# Patient Record
Sex: FEMALE | Race: WHITE | NOT HISPANIC OR LATINO | Employment: UNEMPLOYED | URBAN - METROPOLITAN AREA
[De-identification: names, ages, dates, MRNs, and addresses within clinical notes are randomized per-mention and may not be internally consistent; named-entity substitution may affect disease eponyms.]

---

## 2018-01-29 ENCOUNTER — OFFICE VISIT (OUTPATIENT)
Dept: URGENT CARE | Facility: CLINIC | Age: 33
End: 2018-01-29
Payer: MEDICAID

## 2018-01-29 VITALS
SYSTOLIC BLOOD PRESSURE: 119 MMHG | HEIGHT: 62 IN | TEMPERATURE: 98 F | HEART RATE: 101 BPM | WEIGHT: 220 LBS | OXYGEN SATURATION: 99 % | RESPIRATION RATE: 18 BRPM | BODY MASS INDEX: 40.48 KG/M2 | DIASTOLIC BLOOD PRESSURE: 89 MMHG

## 2018-01-29 DIAGNOSIS — K52.9 GASTROENTERITIS: Primary | ICD-10-CM

## 2018-01-29 DIAGNOSIS — L73.2 HIDRADENITIS SUPPURATIVA: ICD-10-CM

## 2018-01-29 PROCEDURE — 99203 OFFICE O/P NEW LOW 30 MIN: CPT | Mod: S$GLB,,, | Performed by: PHYSICIAN ASSISTANT

## 2018-01-29 RX ORDER — ONDANSETRON 4 MG/1
4 TABLET, ORALLY DISINTEGRATING ORAL EVERY 6 HOURS PRN
Qty: 20 TABLET | Refills: 0 | Status: SHIPPED | OUTPATIENT
Start: 2018-01-29 | End: 2023-10-10

## 2018-01-29 NOTE — PROGRESS NOTES
"Subjective:       Patient ID: Page Francois is a 32 y.o. female.    Vitals:  height is 5' 2" (1.575 m) and weight is 99.8 kg (220 lb). Her temperature is 97.8 °F (36.6 °C). Her blood pressure is 119/89 and her pulse is 101. Her respiration is 18 and oxygen saturation is 99%.     Chief Complaint: Emesis    Emesis    This is a new problem. The current episode started today. The problem occurs 2 to 4 times per day. The problem has been unchanged. The emesis has an appearance of stomach contents (tasted like metal). The maximum temperature recorded prior to her arrival was 100.4 - 100.9 F. The fever has been present for less than 1 day. Associated symptoms include chills and headaches. Pertinent negatives include no abdominal pain, chest pain, coughing, fever or myalgias. She has tried nothing for the symptoms. The treatment provided no relief.     Review of Systems   Constitution: Positive for chills, weakness and malaise/fatigue. Negative for fever.   HENT: Positive for congestion. Negative for ear pain, hoarse voice and sore throat.    Eyes: Negative for discharge and redness.   Cardiovascular: Negative for chest pain, dyspnea on exertion and leg swelling.   Respiratory: Negative for cough, shortness of breath, sputum production and wheezing.    Musculoskeletal: Negative for myalgias.   Gastrointestinal: Positive for nausea and vomiting. Negative for abdominal pain.   Neurological: Positive for headaches.       Objective:      Physical Exam   Constitutional: She is oriented to person, place, and time. She appears well-developed and well-nourished.   HENT:   Head: Normocephalic and atraumatic.   Right Ear: Hearing, tympanic membrane, external ear and ear canal normal.   Left Ear: Hearing, tympanic membrane, external ear and ear canal normal.   Nose: Nose normal.   Mouth/Throat: Uvula is midline and oropharynx is clear and moist.   Eyes: Conjunctivae are normal.   Neck: Normal range of motion. Neck supple. No thyromegaly " present.   Cardiovascular: Normal rate and regular rhythm.  Exam reveals no gallop and no friction rub.    No murmur heard.  Pulmonary/Chest: Effort normal and breath sounds normal. She has no wheezes. She has no rales.   Abdominal: Soft. Bowel sounds are normal. There is no tenderness.   Musculoskeletal: Normal range of motion.   Lymphadenopathy:     She has no cervical adenopathy.   Neurological: She is alert and oriented to person, place, and time.   Skin: Skin is warm and dry. No rash noted. No erythema.   Scarring in bilateral axilla from previous abscesses in the past.    Psychiatric: She has a normal mood and affect. Her behavior is normal. Judgment and thought content normal.   Nursing note and vitals reviewed.      Assessment:       1. Gastroenteritis    2. Hidradenitis suppurativa        Plan:         Gastroenteritis  -     ondansetron (ZOFRAN-ODT) 4 MG TbDL; Take 1 tablet (4 mg total) by mouth every 6 (six) hours as needed.  Dispense: 20 tablet; Refill: 0    Hidradenitis suppurativa      Page was seen today for emesis.    Diagnoses and all orders for this visit:    Gastroenteritis  -     ondansetron (ZOFRAN-ODT) 4 MG TbDL; Take 1 tablet (4 mg total) by mouth every 6 (six) hours as needed.    Hidradenitis suppurativa      Patient Instructions     - Rest.    - Drink plenty of fluids.    - Tylenol or Ibuprofen as directed as needed for fever/pain.    - Take over-the-counter claritin, zyrtec, allegra, or xyzal as directed.  - Use over the counter Flonase as directed for sinus congestion and postnasal drip.  - use nasal saline prior to Flonase.  - Antibiotics are not needed at this time.  - Usual course of cold symptom is 10-14 days, but longer if patient is a smoker.   - Use salt water gargle for sore throat.   - Follow up with your PCP or specialty clinic as directed in the next 1-2 weeks if not improved or as needed.  You can call [phone] to schedule an appointment with the appropriate provider.     - Go to the ED if your symptoms worsen.    Food Poisoning or Viral Gastroenteritis (Adult)  You have a stomach illness that is likely either food poisoning or viral gastroenteritis.  Food poisoning is illness that is passed along in food and affects the stomach and intestinal tract. It usually occurs from 1 to 24 hours after eating food that has spoiled. When it happens within a few hours of eating, it is often caused by toxins from bacteria in food that has not been cooked or refrigerated properly.  Viral gastroenteritis is also an illness from a virus that affects the stomach and intestinal tract. Many people call it the stomach flu, but it has nothing to do with influenza. In fact, it can happen from food poisoning, but it can also happen when germs are passed from person-to-person or contaminated surface (toothbrush, cutting board, toilet) to a person.  Either illness can cause these symptoms:  · Abdominal pain and cramping  · Nausea  · Vomiting  · Diarrhea  · Fever and chills  · Loss of bowel control  The symptoms of food poisoning usually last 1 to 2 days. The symptoms of viral gastroenteritis can sometimes last up to 7 days but usually end sooner.  Antibiotics are not effective for either illness.  Other causes of gastroenteritis include bacteria and parasites which are not discussed here.  Home care  Follow all instructions given by your healthcare provider. Rest at home for the next 24 hours, or until you feel better. Avoid caffeine, tobacco, and alcohol. These can make diarrhea, cramping, and pain worse.  If taking medicines:  · Dont take over-the-counter diarrhea or nausea medicines unless your healthcare provider tells you to.  · You may use acetaminophen or NSAID medicines like ibuprofen or naproxen to reduce pain and fever. Dont use these if you have chronic liver or kidney disease, or ever had a stomach ulcer or GI bleeding. Talk with your healthcare provider first. Don't use NSAID medicines if  you are already taking one for another condition (like arthritis) or are on daily aspirin therapy (such as for heart disease or after a stroke).  To prevent the spread of illness:  · Remember that washing with soap and water is the best way to prevent the spread of infection. Wash your hands before and after caring for a sick person.  · Clean the toilet after each use.  · Wash your hands or use alcohol-based hand  before eating.  · Wash your hands or use alcohol-based hand  before and after preparing food. Keep in mind that people with diarrhea or vomiting should not prepare food for others.  · Wash your hands or use alcohol-based hand  after using cutting boards, counter-tops, and knives (and other utensils) that have been in contact with raw foods.  · Wash and then peel fruits and vegetables.  · Keep uncooked meats away from cooked and ready-to-eat foods.  · Use a food thermometer when cooking. Cook poultry to at least 165°F (74°C). Cook ground meat (beef, veal, pork, lamb) to at least 160°F (71°C). Cook fresh beef, veal, lamb, and pork to at least 145°F (63°C).  · Dont eat raw or undercooked eggs (poached or bridget side up), poultry, meat or unpasteurized milk and juices.  Food and drinks  The main goal while treating vomiting or diarrhea is to prevent dehydration. This is done by taking small amounts of liquids often.  · Keep in mind that liquids are more important than food right now.  · Drink only small amounts of liquids at a time.  · Dont force yourself to eat, especially if you are having cramping, vomiting, or diarrhea. Dont eat large amounts at a time, even if you are hungry.  · If you eat, avoid fatty, greasy, spicy, or fried foods.  · Dont eat dairy foods or drink milk if you have diarrhea. These can make diarrhea worse.  The first 24 hours you can try:  · Oral rehydration solutions, available at grocery stores and pharmacies. Sports drinks are not a good choice if you are  very dehydrated. They have too much sugar and not enough electrolytes.  · Soft drinks without caffeine  · Ginger ale  · Water (plain or flavored)  · Decaf tea or coffee  · Clear broth, consommé, or bouillon  · Gelatin, popsicles, or frozen fruit juice bars   The second 24 hours, if you are feeling better, you can add:  · Hot cereal, plain toast, bread, rolls, or crackers  · Plain noodles, rice, mashed potatoes, chicken noodle soup, or rice soup  · Unsweetened canned fruit (no pineapple)  · Bananas  As you recover:  · Limit fat intake to less than 15 grams per day. Dont eat margarine, butter, oils, mayonnaise, sauces, gravies, fried foods, peanut butter, meat, poultry, or fish.  · Limit fiber. Dont eat raw or cooked vegetables, fresh fruits except bananas, and bran cereals.  · Limit caffeine and chocolate.  · Dont use spices or seasonings except salt.  · Resume a normal diet over time, as you feel better and your symptoms improve.  · If the symptoms come back, go back to a simple diet or clear liquids.  Follow-up care  Follow up with your healthcare provider, or as advised. If a stool sample was taken or cultures were done, call the healthcare provider for the results as instructed.  Call 911  Call 911 if you have any of these symptoms:  · Trouble breathing  · Confusion  · Extreme drowsiness or trouble walking  · Loss of consciousness  · Rapid heart rate  · Chest pain  · Stiff neck  · Seizure  When to seek medical advice  Call your healthcare provider right away if any of these occur:  · Abdominal pain that gets worse  · Constant lower right abdominal pain  · Continued vomiting and inability to keep liquids down  · Diarrhea more than 5 times a day  · Blood in vomit or stool  · Dark urine or no urine for 8 hours, dry mouth and tongue, tiredness, weakness, or dizziness  · New rash  · You dont get better in 2 to 3 days  · Fever of 100.4°F (38°C) or higher that doesnt get lower with medicine  · You have new symptoms  of arthritis  Date Last Reviewed: 1/3/2016  © 6794-5038 [employer]. [address] [place] [address], [place] [address]. All rights reserved. This information is not intended as a substitute for professional medical care. Always follow your healthcare professional's instructions.        Food Poisoning or Viral Gastroenteritis (Adult)  You have a stomach illness that is likely either food poisoning or viral gastroenteritis.  Food poisoning is illness that is passed along in food and affects the stomach and intestinal tract. It usually occurs from 1 to 24 hours after eating food that has spoiled. When it happens within a few hours of eating, it is often caused by toxins from bacteria in food that has not been cooked or refrigerated properly.  Viral gastroenteritis is also an illness from a virus that affects the stomach and intestinal tract. Many people call it the stomach flu, but it has nothing to do with influenza. In fact, it can happen from food poisoning, but it can also happen when germs are passed from person-to-person or contaminated surface (toothbrush, cutting board, toilet) to a person.  Either illness can cause these symptoms:  · Abdominal pain and cramping  · Nausea  · Vomiting  · Diarrhea  · Fever and chills  · Loss of bowel control  The symptoms of food poisoning usually last 1 to 2 days. The symptoms of viral gastroenteritis can sometimes last up to 7 days but usually end sooner.  Antibiotics are not effective for either illness.  Other causes of gastroenteritis include bacteria and parasites which are not discussed here.  Home care  Follow all instructions given by your healthcare provider. Rest at home for the next 24 hours, or until you feel better. Avoid caffeine, tobacco, and alcohol. These can make diarrhea, cramping, and pain worse.  If taking medicines:  · Dont take over-the-counter diarrhea or nausea medicines unless your healthcare provider tells you to.  · You may use acetaminophen  or NSAID medicines like ibuprofen or naproxen to reduce pain and fever. Dont use these if you have chronic liver or kidney disease, or ever had a stomach ulcer or GI bleeding. Talk with your healthcare provider first. Don't use NSAID medicines if you are already taking one for another condition (like arthritis) or are on daily aspirin therapy (such as for heart disease or after a stroke).  To prevent the spread of illness:  · Remember that washing with soap and water is the best way to prevent the spread of infection. Wash your hands before and after caring for a sick person.  · Clean the toilet after each use.  · Wash your hands or use alcohol-based hand  before eating.  · Wash your hands or use alcohol-based hand  before and after preparing food. Keep in mind that people with diarrhea or vomiting should not prepare food for others.  · Wash your hands or use alcohol-based hand  after using cutting boards, counter-tops, and knives (and other utensils) that have been in contact with raw foods.  · Wash and then peel fruits and vegetables.  · Keep uncooked meats away from cooked and ready-to-eat foods.  · Use a food thermometer when cooking. Cook poultry to at least 165°F (74°C). Cook ground meat (beef, veal, pork, lamb) to at least 160°F (71°C). Cook fresh beef, veal, lamb, and pork to at least 145°F (63°C).  · Dont eat raw or undercooked eggs (poached or bridget side up), poultry, meat or unpasteurized milk and juices.  Food and drinks  The main goal while treating vomiting or diarrhea is to prevent dehydration. This is done by taking small amounts of liquids often.  · Keep in mind that liquids are more important than food right now.  · Drink only small amounts of liquids at a time.  · Dont force yourself to eat, especially if you are having cramping, vomiting, or diarrhea. Dont eat large amounts at a time, even if you are hungry.  · If you eat, avoid fatty, greasy, spicy, or fried  foods.  · Dont eat dairy foods or drink milk if you have diarrhea. These can make diarrhea worse.  The first 24 hours you can try:  · Oral rehydration solutions, available at grocery stores and pharmacies. Sports drinks are not a good choice if you are very dehydrated. They have too much sugar and not enough electrolytes.  · Soft drinks without caffeine  · Ginger ale  · Water (plain or flavored)  · Decaf tea or coffee  · Clear broth, consommé, or bouillon  · Gelatin, popsicles, or frozen fruit juice bars   The second 24 hours, if you are feeling better, you can add:  · Hot cereal, plain toast, bread, rolls, or crackers  · Plain noodles, rice, mashed potatoes, chicken noodle soup, or rice soup  · Unsweetened canned fruit (no pineapple)  · Bananas  As you recover:  · Limit fat intake to less than 15 grams per day. Dont eat margarine, butter, oils, mayonnaise, sauces, gravies, fried foods, peanut butter, meat, poultry, or fish.  · Limit fiber. Dont eat raw or cooked vegetables, fresh fruits except bananas, and bran cereals.  · Limit caffeine and chocolate.  · Dont use spices or seasonings except salt.  · Resume a normal diet over time, as you feel better and your symptoms improve.  · If the symptoms come back, go back to a simple diet or clear liquids.  Follow-up care  Follow up with your healthcare provider, or as advised. If a stool sample was taken or cultures were done, call the healthcare provider for the results as instructed.  Call 911  Call 911 if you have any of these symptoms:  · Trouble breathing  · Confusion  · Extreme drowsiness or trouble walking  · Loss of consciousness  · Rapid heart rate  · Chest pain  · Stiff neck  · Seizure  When to seek medical advice  Call your healthcare provider right away if any of these occur:  · Abdominal pain that gets worse  · Constant lower right abdominal pain  · Continued vomiting and inability to keep liquids down  · Diarrhea more than 5 times a day  · Blood in vomit  or stool  · Dark urine or no urine for 8 hours, dry mouth and tongue, tiredness, weakness, or dizziness  · New rash  · You dont get better in 2 to 3 days  · Fever of 100.4°F (38°C) or higher that doesnt get lower with medicine  · You have new symptoms of arthritis  Date Last Reviewed: 1/3/2016  © 6143-1489 [employer]. [address] [place] [address], [place] [address]. All rights reserved. This information is not intended as a substitute for professional medical care. Always follow your healthcare professional's instructions.        Understanding Hidradenitis Suppurativa  Hidradenitis suppurativa is a long-term (chronic) skin disease. It causes painful bumps and sores (abscesses) to form around a hair follicle. Follicles are the tiny holes from which hair grows out of your skin. The disease occurs on parts of the body where skin rubs together. It most often appears in the armpits, the groin area, and under the breasts. It is more common in women.  How to say it  [id number]   What causes hidradenitis suppurativa?  This skin disease happens when hair follicles become clogged with keratin. Keratin is the protein that makes up your hair and nails. The follicles then burst and become infected. Pressure or rubbing on the skin can clog the follicles. Or it can further irritate them.  The disease tends to run in families. Its also more likely to occur in people who are obese, have diabetes, or smoke. Hormones may also play a part.  Symptoms of hidradenitis suppurativa  This skin disease causes one or more painful red bumps on the skin. These bumps become infected and drain pus. They may also itch or burn. In severe cases, sinus tracts may form. These are narrow channels that run under the skin. Blood or a bad-smelling pus may ooze from these bumps or sinus tracts. Bands of scarring often occur.  Treatment for hidradenitis suppurativa  Treatment for this skin disease is most successful when  started early. But it may be hard to diagnose. It may be mistaken for other skin conditions. The painful bumps also often return. So stopping new bumps and limiting scarring is important. Treatment options include:  · Warm compress. Putting a warm, wet washcloth on the affected skin may help.  · Lifestyle changes. Your symptoms may get better if you lose weight or stop smoking, if needed. Also avoid shaving or other irritants, such as deodorant or perfume.  · Antibiotics. For mild cases, an antibiotic for the skin (topical) may help. You may need oral antibiotics if you have a severe case. They can help prevent further infection.  · Other oral medicines. Over-the-counter pain medicines can ease pain and inflammation. You may need stronger medicines for a severe case. These medicines include corticosteroids or a retinoid. These may cause side effects.  · Injected medicines. A steroid may be injected into the bump to ease pain. A biologic may be injected to ease severe symptoms.  · Surgery. Surgery can drain and remove the painful bumps. For severe cases, the doctor may cut out the entire area of affected skin or destroy it with a laser.  Complications of hidradenitis suppurativa  These include:  · Arthritis  · Depression  · Lymphedema  · Scarring of skin  · Skin cancer  When to call your healthcare provider  Call your healthcare provider right away if you have any of these:  · Fever of 100.4°F (38°C) or higher, or as directed  · Redness, swelling, or fluid leaking from your rash that gets worse  · Pain that gets worse  · Symptoms that dont get better, or get worse  · New symptoms   Date Last Reviewed: 5/1/2016  © 2763-0371 [employer]. [address] [place] [address], [place] [address]. All rights reserved. This information is not intended as a substitute for professional medical care. Always follow your healthcare professional's instructions.

## 2018-01-29 NOTE — PATIENT INSTRUCTIONS
- Rest.    - Drink plenty of fluids.    - Tylenol or Ibuprofen as directed as needed for fever/pain.    - Take over-the-counter claritin, zyrtec, allegra, or xyzal as directed.  - Use over the counter Flonase as directed for sinus congestion and postnasal drip.  - use nasal saline prior to Flonase.  - Antibiotics are not needed at this time.  - Usual course of cold symptom is 10-14 days, but longer if patient is a smoker.   - Use salt water gargle for sore throat.   - Follow up with your PCP or specialty clinic as directed in the next 1-2 weeks if not improved or as needed.  You can call [phone] to schedule an appointment with the appropriate provider.    - Go to the ED if your symptoms worsen.    Food Poisoning or Viral Gastroenteritis (Adult)  You have a stomach illness that is likely either food poisoning or viral gastroenteritis.  Food poisoning is illness that is passed along in food and affects the stomach and intestinal tract. It usually occurs from 1 to 24 hours after eating food that has spoiled. When it happens within a few hours of eating, it is often caused by toxins from bacteria in food that has not been cooked or refrigerated properly.  Viral gastroenteritis is also an illness from a virus that affects the stomach and intestinal tract. Many people call it the stomach flu, but it has nothing to do with influenza. In fact, it can happen from food poisoning, but it can also happen when germs are passed from person-to-person or contaminated surface (toothbrush, cutting board, toilet) to a person.  Either illness can cause these symptoms:  · Abdominal pain and cramping  · Nausea  · Vomiting  · Diarrhea  · Fever and chills  · Loss of bowel control  The symptoms of food poisoning usually last 1 to 2 days. The symptoms of viral gastroenteritis can sometimes last up to 7 days but usually end sooner.  Antibiotics are not effective for either illness.  Other causes of gastroenteritis include bacteria  and parasites which are not discussed here.  Home care  Follow all instructions given by your healthcare provider. Rest at home for the next 24 hours, or until you feel better. Avoid caffeine, tobacco, and alcohol. These can make diarrhea, cramping, and pain worse.  If taking medicines:  · Dont take over-the-counter diarrhea or nausea medicines unless your healthcare provider tells you to.  · You may use acetaminophen or NSAID medicines like ibuprofen or naproxen to reduce pain and fever. Dont use these if you have chronic liver or kidney disease, or ever had a stomach ulcer or GI bleeding. Talk with your healthcare provider first. Don't use NSAID medicines if you are already taking one for another condition (like arthritis) or are on daily aspirin therapy (such as for heart disease or after a stroke).  To prevent the spread of illness:  · Remember that washing with soap and water is the best way to prevent the spread of infection. Wash your hands before and after caring for a sick person.  · Clean the toilet after each use.  · Wash your hands or use alcohol-based hand  before eating.  · Wash your hands or use alcohol-based hand  before and after preparing food. Keep in mind that people with diarrhea or vomiting should not prepare food for others.  · Wash your hands or use alcohol-based hand  after using cutting boards, counter-tops, and knives (and other utensils) that have been in contact with raw foods.  · Wash and then peel fruits and vegetables.  · Keep uncooked meats away from cooked and ready-to-eat foods.  · Use a food thermometer when cooking. Cook poultry to at least 165°F (74°C). Cook ground meat (beef, veal, pork, lamb) to at least 160°F (71°C). Cook fresh beef, veal, lamb, and pork to at least 145°F (63°C).  · Dont eat raw or undercooked eggs (poached or bridget side up), poultry, meat or unpasteurized milk and juices.  Food and drinks  The main goal while treating vomiting  or diarrhea is to prevent dehydration. This is done by taking small amounts of liquids often.  · Keep in mind that liquids are more important than food right now.  · Drink only small amounts of liquids at a time.  · Dont force yourself to eat, especially if you are having cramping, vomiting, or diarrhea. Dont eat large amounts at a time, even if you are hungry.  · If you eat, avoid fatty, greasy, spicy, or fried foods.  · Dont eat dairy foods or drink milk if you have diarrhea. These can make diarrhea worse.  The first 24 hours you can try:  · Oral rehydration solutions, available at grocery stores and pharmacies. Sports drinks are not a good choice if you are very dehydrated. They have too much sugar and not enough electrolytes.  · Soft drinks without caffeine  · Ginger ale  · Water (plain or flavored)  · Decaf tea or coffee  · Clear broth, consommé, or bouillon  · Gelatin, popsicles, or frozen fruit juice bars   The second 24 hours, if you are feeling better, you can add:  · Hot cereal, plain toast, bread, rolls, or crackers  · Plain noodles, rice, mashed potatoes, chicken noodle soup, or rice soup  · Unsweetened canned fruit (no pineapple)  · Bananas  As you recover:  · Limit fat intake to less than 15 grams per day. Dont eat margarine, butter, oils, mayonnaise, sauces, gravies, fried foods, peanut butter, meat, poultry, or fish.  · Limit fiber. Dont eat raw or cooked vegetables, fresh fruits except bananas, and bran cereals.  · Limit caffeine and chocolate.  · Dont use spices or seasonings except salt.  · Resume a normal diet over time, as you feel better and your symptoms improve.  · If the symptoms come back, go back to a simple diet or clear liquids.  Follow-up care  Follow up with your healthcare provider, or as advised. If a stool sample was taken or cultures were done, call the healthcare provider for the results as instructed.  Call 911  Call 911 if you have any of these symptoms:  · Trouble  breathing  · Confusion  · Extreme drowsiness or trouble walking  · Loss of consciousness  · Rapid heart rate  · Chest pain  · Stiff neck  · Seizure  When to seek medical advice  Call your healthcare provider right away if any of these occur:  · Abdominal pain that gets worse  · Constant lower right abdominal pain  · Continued vomiting and inability to keep liquids down  · Diarrhea more than 5 times a day  · Blood in vomit or stool  · Dark urine or no urine for 8 hours, dry mouth and tongue, tiredness, weakness, or dizziness  · New rash  · You dont get better in 2 to 3 days  · Fever of 100.4°F (38°C) or higher that doesnt get lower with medicine  · You have new symptoms of arthritis  Date Last Reviewed: 1/3/2016  © 7925-5997 [employer]. [address] [place] [address], [place] [address]. All rights reserved. This information is not intended as a substitute for professional medical care. Always follow your healthcare professional's instructions.        Food Poisoning or Viral Gastroenteritis (Adult)  You have a stomach illness that is likely either food poisoning or viral gastroenteritis.  Food poisoning is illness that is passed along in food and affects the stomach and intestinal tract. It usually occurs from 1 to 24 hours after eating food that has spoiled. When it happens within a few hours of eating, it is often caused by toxins from bacteria in food that has not been cooked or refrigerated properly.  Viral gastroenteritis is also an illness from a virus that affects the stomach and intestinal tract. Many people call it the stomach flu, but it has nothing to do with influenza. In fact, it can happen from food poisoning, but it can also happen when germs are passed from person-to-person or contaminated surface (toothbrush, cutting board, toilet) to a person.  Either illness can cause these symptoms:  · Abdominal pain and cramping  · Nausea  · Vomiting  · Diarrhea  · Fever and chills  · Loss of bowel  control  The symptoms of food poisoning usually last 1 to 2 days. The symptoms of viral gastroenteritis can sometimes last up to 7 days but usually end sooner.  Antibiotics are not effective for either illness.  Other causes of gastroenteritis include bacteria and parasites which are not discussed here.  Home care  Follow all instructions given by your healthcare provider. Rest at home for the next 24 hours, or until you feel better. Avoid caffeine, tobacco, and alcohol. These can make diarrhea, cramping, and pain worse.  If taking medicines:  · Dont take over-the-counter diarrhea or nausea medicines unless your healthcare provider tells you to.  · You may use acetaminophen or NSAID medicines like ibuprofen or naproxen to reduce pain and fever. Dont use these if you have chronic liver or kidney disease, or ever had a stomach ulcer or GI bleeding. Talk with your healthcare provider first. Don't use NSAID medicines if you are already taking one for another condition (like arthritis) or are on daily aspirin therapy (such as for heart disease or after a stroke).  To prevent the spread of illness:  · Remember that washing with soap and water is the best way to prevent the spread of infection. Wash your hands before and after caring for a sick person.  · Clean the toilet after each use.  · Wash your hands or use alcohol-based hand  before eating.  · Wash your hands or use alcohol-based hand  before and after preparing food. Keep in mind that people with diarrhea or vomiting should not prepare food for others.  · Wash your hands or use alcohol-based hand  after using cutting boards, counter-tops, and knives (and other utensils) that have been in contact with raw foods.  · Wash and then peel fruits and vegetables.  · Keep uncooked meats away from cooked and ready-to-eat foods.  · Use a food thermometer when cooking. Cook poultry to at least 165°F (74°C). Cook ground meat (beef, veal, pork, lamb)  to at least 160°F (71°C). Cook fresh beef, veal, lamb, and pork to at least 145°F (63°C).  · Dont eat raw or undercooked eggs (poached or bridget side up), poultry, meat or unpasteurized milk and juices.  Food and drinks  The main goal while treating vomiting or diarrhea is to prevent dehydration. This is done by taking small amounts of liquids often.  · Keep in mind that liquids are more important than food right now.  · Drink only small amounts of liquids at a time.  · Dont force yourself to eat, especially if you are having cramping, vomiting, or diarrhea. Dont eat large amounts at a time, even if you are hungry.  · If you eat, avoid fatty, greasy, spicy, or fried foods.  · Dont eat dairy foods or drink milk if you have diarrhea. These can make diarrhea worse.  The first 24 hours you can try:  · Oral rehydration solutions, available at grocery stores and pharmacies. Sports drinks are not a good choice if you are very dehydrated. They have too much sugar and not enough electrolytes.  · Soft drinks without caffeine  · Ginger ale  · Water (plain or flavored)  · Decaf tea or coffee  · Clear broth, consommé, or bouillon  · Gelatin, popsicles, or frozen fruit juice bars   The second 24 hours, if you are feeling better, you can add:  · Hot cereal, plain toast, bread, rolls, or crackers  · Plain noodles, rice, mashed potatoes, chicken noodle soup, or rice soup  · Unsweetened canned fruit (no pineapple)  · Bananas  As you recover:  · Limit fat intake to less than 15 grams per day. Dont eat margarine, butter, oils, mayonnaise, sauces, gravies, fried foods, peanut butter, meat, poultry, or fish.  · Limit fiber. Dont eat raw or cooked vegetables, fresh fruits except bananas, and bran cereals.  · Limit caffeine and chocolate.  · Dont use spices or seasonings except salt.  · Resume a normal diet over time, as you feel better and your symptoms improve.  · If the symptoms come back, go back to a simple diet or clear  liquids.  Follow-up care  Follow up with your healthcare provider, or as advised. If a stool sample was taken or cultures were done, call the healthcare provider for the results as instructed.  Call 911  Call 911 if you have any of these symptoms:  · Trouble breathing  · Confusion  · Extreme drowsiness or trouble walking  · Loss of consciousness  · Rapid heart rate  · Chest pain  · Stiff neck  · Seizure  When to seek medical advice  Call your healthcare provider right away if any of these occur:  · Abdominal pain that gets worse  · Constant lower right abdominal pain  · Continued vomiting and inability to keep liquids down  · Diarrhea more than 5 times a day  · Blood in vomit or stool  · Dark urine or no urine for 8 hours, dry mouth and tongue, tiredness, weakness, or dizziness  · New rash  · You dont get better in 2 to 3 days  · Fever of 100.4°F (38°C) or higher that doesnt get lower with medicine  · You have new symptoms of arthritis  Date Last Reviewed: 1/3/2016  © 6838-6435 [employer]. [address] [place] [address]. All rights reserved. This information is not intended as a substitute for professional medical care. Always follow your healthcare professional's instructions.        Understanding Hidradenitis Suppurativa  Hidradenitis suppurativa is a long-term (chronic) skin disease. It causes painful bumps and sores (abscesses) to form around a hair follicle. Follicles are the tiny holes from which hair grows out of your skin. The disease occurs on parts of the body where skin rubs together. It most often appears in the armpits, the groin area, and under the breasts. It is more common in women.  How to say it  [id number]   What causes hidradenitis suppurativa?  This skin disease happens when hair follicles become clogged with keratin. Keratin is the protein that makes up your hair and nails. The follicles then burst and become infected. Pressure or rubbing on  the skin can clog the follicles. Or it can further irritate them.  The disease tends to run in families. Its also more likely to occur in people who are obese, have diabetes, or smoke. Hormones may also play a part.  Symptoms of hidradenitis suppurativa  This skin disease causes one or more painful red bumps on the skin. These bumps become infected and drain pus. They may also itch or burn. In severe cases, sinus tracts may form. These are narrow channels that run under the skin. Blood or a bad-smelling pus may ooze from these bumps or sinus tracts. Bands of scarring often occur.  Treatment for hidradenitis suppurativa  Treatment for this skin disease is most successful when started early. But it may be hard to diagnose. It may be mistaken for other skin conditions. The painful bumps also often return. So stopping new bumps and limiting scarring is important. Treatment options include:  · Warm compress. Putting a warm, wet washcloth on the affected skin may help.  · Lifestyle changes. Your symptoms may get better if you lose weight or stop smoking, if needed. Also avoid shaving or other irritants, such as deodorant or perfume.  · Antibiotics. For mild cases, an antibiotic for the skin (topical) may help. You may need oral antibiotics if you have a severe case. They can help prevent further infection.  · Other oral medicines. Over-the-counter pain medicines can ease pain and inflammation. You may need stronger medicines for a severe case. These medicines include corticosteroids or a retinoid. These may cause side effects.  · Injected medicines. A steroid may be injected into the bump to ease pain. A biologic may be injected to ease severe symptoms.  · Surgery. Surgery can drain and remove the painful bumps. For severe cases, the doctor may cut out the entire area of affected skin or destroy it with a laser.  Complications of hidradenitis suppurativa  These  include:  · Arthritis  · Depression  · Lymphedema  · Scarring of skin  · Skin cancer  When to call your healthcare provider  Call your healthcare provider right away if you have any of these:  · Fever of 100.4°F (38°C) or higher, or as directed  · Redness, swelling, or fluid leaking from your rash that gets worse  · Pain that gets worse  · Symptoms that dont get better, or get worse  · New symptoms   Date Last Reviewed: 5/1/2016  © 4727-1530 [employer]. [address] [place] [address], [place] [address]. All rights reserved. This information is not intended as a substitute for professional medical care. Always follow your healthcare professional's instructions.

## 2018-01-29 NOTE — LETTER
January 29, 2018      [facility]  [address]  [place] [address]  Phone: [phone]  Fax: [phone]       Patient: Page Francois   YOB: 1985  Date of Visit: 01/29/2018    To Whom It May Concern:    Leland Francois  was at [facility] on 01/29/2018. She may return to work/school on 01/30/2018 with no restrictions. If you have any questions or concerns, or if I can be of further assistance, please do not hesitate to contact me.    Sincerely,        Alissa Ferreira PA-C

## 2019-11-11 ENCOUNTER — OFFICE VISIT (OUTPATIENT)
Dept: URGENT CARE | Facility: CLINIC | Age: 34
End: 2019-11-11
Payer: COMMERCIAL

## 2019-11-11 VITALS
SYSTOLIC BLOOD PRESSURE: 135 MMHG | OXYGEN SATURATION: 98 % | RESPIRATION RATE: 20 BRPM | DIASTOLIC BLOOD PRESSURE: 89 MMHG | HEIGHT: 62 IN | WEIGHT: 225 LBS | HEART RATE: 94 BPM | TEMPERATURE: 97 F | BODY MASS INDEX: 41.41 KG/M2

## 2019-11-11 DIAGNOSIS — R05.3 CHRONIC COUGH: Primary | ICD-10-CM

## 2019-11-11 PROCEDURE — 99214 PR OFFICE/OUTPT VISIT, EST, LEVL IV, 30-39 MIN: ICD-10-PCS | Mod: S$GLB,,, | Performed by: FAMILY MEDICINE

## 2019-11-11 PROCEDURE — 99214 OFFICE O/P EST MOD 30 MIN: CPT | Mod: S$GLB,,, | Performed by: FAMILY MEDICINE

## 2019-11-11 PROCEDURE — 3008F PR BODY MASS INDEX (BMI) DOCUMENTED: ICD-10-PCS | Mod: CPTII,S$GLB,, | Performed by: FAMILY MEDICINE

## 2019-11-11 PROCEDURE — 3008F BODY MASS INDEX DOCD: CPT | Mod: CPTII,S$GLB,, | Performed by: FAMILY MEDICINE

## 2019-11-11 RX ORDER — ALBUTEROL SULFATE 90 UG/1
2 AEROSOL, METERED RESPIRATORY (INHALATION) EVERY 6 HOURS PRN
Qty: 18 G | Refills: 0 | Status: SHIPPED | OUTPATIENT
Start: 2019-11-11 | End: 2023-10-10

## 2019-11-11 NOTE — PATIENT INSTRUCTIONS

## 2019-11-11 NOTE — PROGRESS NOTES
"Subjective:       Patient ID: Page Francois is a 34 y.o. female.    Vitals:  height is 5' 2" (1.575 m) and weight is 102.1 kg (225 lb). Her oral temperature is 97.4 °F (36.3 °C). Her blood pressure is 135/89 and her pulse is 94. Her respiration is 20 and oxygen saturation is 98%.     Chief Complaint: Cough    This is a 34 y.o. female who presents today with a chief complaint of chronic cough.  Patient states about 1 month ago it got worse.  She states she coughs so much she cannot catch her breath. She states last night while sleeping, she woke up with a feeling that something was stuck in her throat.  She has not taken any medication for this. Hx of tobacco abuse 15pk/yr history. Denies constitutional symptoms. No fever, chills or other systemic symptoms    Cough   This is a chronic problem. The current episode started more than 1 month ago (1 month ago). The problem has been gradually worsening. The problem occurs every few hours. Associated symptoms include postnasal drip. Pertinent negatives include no chills, ear pain, eye redness, fever, hemoptysis, myalgias, rash, sore throat, shortness of breath or wheezing. She has tried nothing for the symptoms.       Constitution: Negative for chills, sweating, fatigue and fever.   HENT: Positive for postnasal drip, sinus pain and sinus pressure. Negative for ear pain, congestion, sore throat and voice change.    Neck: Negative for painful lymph nodes.   Eyes: Negative for eye redness.   Respiratory: Positive for cough and sputum production. Negative for chest tightness, bloody sputum, COPD, shortness of breath, stridor, wheezing and asthma.    Gastrointestinal: Negative for nausea and vomiting.   Musculoskeletal: Negative for muscle ache.   Skin: Negative for rash.   Allergic/Immunologic: Negative for seasonal allergies and asthma.   Hematologic/Lymphatic: Negative for swollen lymph nodes.       Objective:      Physical Exam   Constitutional: She appears well-developed and " well-nourished.   HENT:   Head: Normocephalic and atraumatic.   Eyes: Pupils are equal, round, and reactive to light. EOM are normal.   Neck: Normal range of motion. Neck supple.   Cardiovascular: Normal rate, regular rhythm and normal heart sounds.   Pulmonary/Chest: Effort normal and breath sounds normal. She has no wheezes. She has no rales.   Nursing note and vitals reviewed.        Assessment:       1. Chronic cough      discussed etiologies such COPD and sleep apnea and need for further workup. Discussed smoking cessation.   Plan:         Chronic cough  -     albuterol (VENTOLIN HFA) 90 mcg/actuation inhaler; Inhale 2 puffs into the lungs every 6 (six) hours as needed for Wheezing. Rescue  Dispense: 18 g; Refill: 0  -     Ambulatory Referral to Family Practice  -     Ambulatory referral to Smoking Cessation Program

## 2019-11-14 ENCOUNTER — TELEPHONE (OUTPATIENT)
Dept: URGENT CARE | Facility: CLINIC | Age: 34
End: 2019-11-14

## 2020-01-08 ENCOUNTER — TELEPHONE (OUTPATIENT)
Dept: PULMONOLOGY | Facility: CLINIC | Age: 35
End: 2020-01-08

## 2020-01-08 DIAGNOSIS — J44.9 CHRONIC OBSTRUCTIVE PULMONARY DISEASE, UNSPECIFIED COPD TYPE: Primary | ICD-10-CM

## 2020-01-09 ENCOUNTER — TELEPHONE (OUTPATIENT)
Dept: PULMONOLOGY | Facility: CLINIC | Age: 35
End: 2020-01-09

## 2020-01-09 NOTE — TELEPHONE ENCOUNTER
Patient was informed that unfortunately at the moment Felicity did not have any earlier appointments that I could reschedule her for on today. I also offered to reschedule Ms. Francois for an earlier appointment on another day, patient declined and stated that she would just keep the appointment that she has scheduled for today.

## 2020-01-09 NOTE — TELEPHONE ENCOUNTER
----- Message from Silvia Hook MA sent at 1/9/2020  9:53 AM CST -----  Contact: Self/[phone]  PT states she has an appt today for 3pm, and stated can she come in at least an 1 hour or 2 hours early due to her having to get her kids at that time (3pm) Requesting call back.

## 2023-08-28 ENCOUNTER — PATIENT MESSAGE (OUTPATIENT)
Dept: ADMINISTRATIVE | Facility: OTHER | Age: 38
End: 2023-08-28

## 2023-08-28 ENCOUNTER — ON-DEMAND VIRTUAL (OUTPATIENT)
Dept: URGENT CARE | Facility: CLINIC | Age: 38
End: 2023-08-28
Payer: COMMERCIAL

## 2023-08-28 DIAGNOSIS — K08.89 TOOTH PAIN: Primary | ICD-10-CM

## 2023-08-28 PROCEDURE — 99213 PR OFFICE/OUTPT VISIT, EST, LEVL III, 20-29 MIN: ICD-10-PCS | Mod: 95,,,

## 2023-08-28 PROCEDURE — 99213 OFFICE O/P EST LOW 20 MIN: CPT | Mod: 95,,,

## 2023-08-28 RX ORDER — AMOXICILLIN 400 MG/5ML
500 POWDER, FOR SUSPENSION ORAL EVERY 12 HOURS
Qty: 126 ML | Refills: 0 | Status: SHIPPED | OUTPATIENT
Start: 2023-08-28 | End: 2023-09-07

## 2023-08-28 NOTE — PROGRESS NOTES
Subjective:      Patient ID: Page Francois is a 37 y.o. female.    Vitals:  vitals were not taken for this visit.     Chief Complaint: Dental Pain      Visit Type: TELE AUDIOVISUAL    Present with the patient at the time of consultation: TELEMED PRESENT WITH PATIENT: None    History reviewed. No pertinent past medical history.  Past Surgical History:   Procedure Laterality Date     SECTION      CHOLECYSTECTOMY      EYE SURGERY      HERNIA REPAIR      TUBAL LIGATION       Review of patient's allergies indicates:   Allergen Reactions    Morphine Other (See Comments)     vomiting     Current Outpatient Medications on File Prior to Visit   Medication Sig Dispense Refill    albuterol (VENTOLIN HFA) 90 mcg/actuation inhaler Inhale 2 puffs into the lungs every 6 (six) hours as needed for Wheezing. Rescue 18 g 0    ondansetron (ZOFRAN-ODT) 4 MG TbDL Take 1 tablet (4 mg total) by mouth every 6 (six) hours as needed. (Patient not taking: Reported on 2019) 20 tablet 0     No current facility-administered medications on file prior to visit.     Family History   Problem Relation Age of Onset    No Known Problems Mother        Medications Ordered                [id number] - [place], [place] - [address] [facility] [address]   [address] [facility] [address]    Telephone: [phone]   Fax: [phone]   Hours: Not open 24 hours                         E-Prescribed (1 of 1)              amoxicillin (AMOXIL) 400 mg/5 mL suspension    Sig: Take 6.3 mLs (504 mg total) by mouth every 12 (twelve) hours. for 10 days       Start: 23     Quantity: 126 mL Refills: 0                           Ohs Peq Odvv Intake    2023  4:24 PM CDT - Filed by Patient   Describe your reason for todays visit Dental pain/infection   What is your current physical address in the event of a medical emergency? [address] [place], [place]   Are you able to take your vital signs? No   Please attach  any relevant images or files          Patient states that has a hole in her bottom right side tooth. Patient states that she is having dental pain due to this. Patient states that she is needing antibiotics.     Dental Pain         Constitution: Negative.   HENT:  Positive for dental problem.    Neck: neck negative.   Cardiovascular: Negative.    Eyes: Negative.    Respiratory: Negative.     Gastrointestinal: Negative.    Genitourinary: Negative.    Musculoskeletal: Negative.    Skin: Negative.    Allergic/Immunologic: Negative.    Neurological: Negative.    Hematologic/Lymphatic: Negative.    Psychiatric/Behavioral: Negative.          Objective:   The physical exam was conducted virtually.  Physical Exam   Constitutional: She is oriented to person, place, and time.   HENT:   Mouth/Throat: Dental caries present.   Eyes: Conjunctivae are normal. Pupils are equal, round, and reactive to light. Extraocular movement intact   Neck: Neck supple.   Pulmonary/Chest: Effort normal.   Abdominal: Normal appearance.   Musculoskeletal: Normal range of motion.         General: Normal range of motion.   Neurological: no focal deficit. She is alert, oriented to person, place, and time and at baseline.   Skin: Skin is warm.   Psychiatric: Her behavior is normal. Mood, judgment and thought content normal.       Assessment:       1. Tooth pain        Plan:       Tooth pain    Other orders  -     amoxicillin (AMOXIL) 400 mg/5 mL suspension; Take 6.3 mLs (504 mg total) by mouth every 12 (twelve) hours. for 10 days  Dispense: 126 mL; Refill: 0

## 2023-08-28 NOTE — PATIENT INSTRUCTIONS
You must understand that you've received an Urgent Care treatment only and that you may be released before all your medical problems are known or treated. You, the patient, will arrange for follow up care as instructed.  Follow up with your PCP or specialty clinic as directed in the next 1-2 weeks if not improved or as needed.  You can call [phone] to schedule an appointment with the appropriate provider.  If your condition worsens we recommend that you receive another evaluation at the emergency room immediately or contact your primary medical clinics after hours call service to discuss your concerns.  Please return here or go to the Emergency Department for any concerns or worsening of condition.  Please if you smoke please consider quitting. [facility] Sm[facility] cessation hotline number is [phone], available at this number is free counseling and medications to live a healthier life!         If you were prescribed a narcotic or controlled medication, do not drive or operate heavy equipment or machinery while taking these medications.

## 2023-10-10 ENCOUNTER — ON-DEMAND VIRTUAL (OUTPATIENT)
Dept: URGENT CARE | Facility: CLINIC | Age: 38
End: 2023-10-10
Payer: COMMERCIAL

## 2023-10-10 DIAGNOSIS — K08.89 PAIN, DENTAL: Primary | ICD-10-CM

## 2023-10-10 PROCEDURE — 99213 PR OFFICE/OUTPT VISIT, EST, LEVL III, 20-29 MIN: ICD-10-PCS | Mod: 95,,, | Performed by: NURSE PRACTITIONER

## 2023-10-10 PROCEDURE — 99213 OFFICE O/P EST LOW 20 MIN: CPT | Mod: 95,,, | Performed by: NURSE PRACTITIONER

## 2023-10-10 RX ORDER — CHLORHEXIDINE GLUCONATE ORAL RINSE 1.2 MG/ML
15 SOLUTION DENTAL 2 TIMES DAILY
Qty: 420 ML | Refills: 0 | Status: SHIPPED | OUTPATIENT
Start: 2023-10-10 | End: 2023-10-24

## 2023-10-10 RX ORDER — AMOXICILLIN 500 MG/1
CAPSULE ORAL
Qty: 11 CAPSULE | Refills: 0 | Status: SHIPPED | OUTPATIENT
Start: 2023-10-10

## 2023-10-10 RX ORDER — IBUPROFEN 800 MG/1
800 TABLET ORAL 3 TIMES DAILY
Qty: 30 TABLET | Refills: 0 | Status: SHIPPED | OUTPATIENT
Start: 2023-10-10 | End: 2023-10-20

## 2023-10-10 NOTE — PATIENT INSTRUCTIONS
Dental Pain Discharge Instructions   About this topic   Tooth pain happens when the nerve in a tooth or the gum tissue around a tooth is irritated. You may have an intense, sharp pain or a mild, dull pain. The pain may be all the time or come and go. Sometimes, you may not know which tooth is causing your pain. You may think the pain is coming from more than one tooth. A fractured tooth, tooth decay, or gum disease are most often the cause of tooth pain. Tooth problems can cause pain in other areas of the head and neck. At times, tooth problems may also cause ear and jaw pain. Pain from a sinus infection may also feel like tooth pain.         What care is needed at home?   Ask your dentist what you need to do when you go home. Make sure you ask questions if you do not understand what the dentist says. This way you will know what you need to do.  Avoid very cold or very hot food or drinks. These may make pain worse.  What follow-up care is needed?   Your dentist may ask you to make visits to the office to check on your progress. Be sure to keep these visits.  Your dentist may send you to an endodontist if you need a root canal.  Your dentist may send you to a periodontist if you have gum disease.  Your dentist may send you to an oral surgeon if you need a tooth or teeth removed.  What drugs may be needed?   The dentist may order drugs to:  Help with pain and swelling  Prevent or fight an infection  Will physical activity be limited?   Based on the cause of your tooth pain, you may have to limit your activity for a short time. Ask your dentist if you need to make changes to your daily routine.  What problems could happen?   If dental pain is not treated you may have:  Infection that spreads to other parts of the body  Gum disease that spreads to other parts of the mouth or body  Tooth decay that gets worse and causes a loss of the tooth or nearby teeth  Pain that gets worse or more intense  Problems eating due to  infection or pain  What can be done to prevent this health problem?   Brush your teeth at least 2 times a day. Use toothpaste with fluoride.  Use dental floss to clean between your teeth every day.  Eat a well-balanced diet.  Try to stay away from foods and drinks that are high in acid, sugar, and starch. These are things like chocolate, sweets, cakes, and fizzy drinks.  See your dentist for regular cleaning and checkups.  Wear a mouth guard or headgear when playing sports.  If you are a smoker, stop smoking. Smoking can make some dental problems worse.  When do I need to call the doctor?   Signs of infection. These include fever of 100.4° F (38°C) or higher; chills; swelling of the gums, neck, or face; discharge around a tooth; or bad taste in your mouth.  Bad toothache or your mouth hurts a lot  Pain that keeps you from sleeping  Trouble swallowing, breathing, or chewing  Trouble opening your mouth all the way  Jaw pain with ear, chest, shoulder, or arm pain  Lots of bleeding from the gums  Increased pain after a tooth is pulled. This may be a dry socket.  Your condition gets worse or you have other concerns  Teach Back: Helping You Understand   The Teach Back Method helps you understand the information we are giving you. After you talk with the staff, tell them in your own words what you learned. This helps to make sure the staff has described each thing clearly. It also helps to explain things that may have been confusing. Before going home, make sure you can do these:  I can tell you about my pain.  I can tell you what may help ease my pain.  I can tell you what I will do if I have trouble breathing, eating, or sleeping because of the pain.  Where can I learn more?   NHS Choices  https://www.nhs.uk/conditions/dental-abscess/   [facility] Choices  https://www.nhs.uk/conditions/toothache/   Last Reviewed Date   2021-09-10  Consumer Information Use and Disclaimer   This information is not specific medical advice and  does not replace information you receive from your health care provider. This is only a brief summary of general information. It does NOT include all information about conditions, illnesses, injuries, tests, procedures, treatments, therapies, discharge instructions or life-style choices that may apply to you. You must talk with your health care provider for complete information about your health and treatment options. This information should not be used to decide whether or not to accept your health care providers advice, instructions or recommendations. Only your health care provider has the knowledge and training to provide advice that is right for you.  Copyright   Copyright © 2021 [employer] and its affiliates and/or licensors. All rights reserved.

## 2023-10-10 NOTE — PROGRESS NOTES
Subjective:      Patient ID: Page Francois is a 37 y.o. female.    Vitals:  vitals were not taken for this visit.     Chief Complaint: Dental Pain      Visit Type: TELE AUDIOVISUAL    Present with the patient at the time of consultation: TELEMED PRESENT WITH PATIENT: None    History reviewed. No pertinent past medical history.  Past Surgical History:   Procedure Laterality Date     SECTION      CHOLECYSTECTOMY      EYE SURGERY      HERNIA REPAIR      TUBAL LIGATION       Review of patient's allergies indicates:   Allergen Reactions    Morphine Other (See Comments)     vomiting     Current Outpatient Medications on File Prior to Visit   Medication Sig Dispense Refill    [DISCONTINUED] albuterol (VENTOLIN HFA) 90 mcg/actuation inhaler Inhale 2 puffs into the lungs every 6 (six) hours as needed for Wheezing. Rescue 18 g 0    [DISCONTINUED] ondansetron (ZOFRAN-ODT) 4 MG TbDL Take 1 tablet (4 mg total) by mouth every 6 (six) hours as needed. (Patient not taking: Reported on 2019) 20 tablet 0     No current facility-administered medications on file prior to visit.     Family History   Problem Relation Age of Onset    No Known Problems Mother        Medications Ordered                [id number] - [facility] [address] AT [facility] [address]   [address] [facility] [address]    Telephone: [phone]   Fax: [phone]   Hours: Not open 24 hours                         E-Prescribed (3 of 3)              amoxicillin (AMOXIL) 500 MG capsule    Sig: Take 2 tablets on day 1, then 1 tablet every 8 hours for 3 days       Start: 10/10/23     Quantity: 11 capsule Refills: 0                         chlorhexidine (PERIDEX) 0.12 % solution    Sig: Use as directed 15 mLs in the mouth or throat 2 (two) times daily. Swish for 30 seconds and spit for 14 days       Start: 10/10/23     Quantity: 420 mL Refills: 0                         ibuprofen (ADVIL,MOTRIN) 800 MG tablet     Sig: Take 1 tablet (800 mg total) by mouth 3 (three) times daily. Take with food. for 10 days       Start: 10/10/23     Quantity: 30 tablet Refills: 0                           Ohs Peq Odvv Intake    10/10/2023  7:44 AM CDT - Filed by Patient   Describe your reason for todays visit Dental pain   What is your current physical address in the event of a medical emergency? [address] [place] [address]   Are you able to take your vital signs? No   Please attach any relevant images or files          Dental pain for months. +dental fracture. Taking OTC meds with little relief.    Dental Pain   Pertinent negatives include no fever.       Constitution: Negative for chills and fever.   HENT:  Positive for ear pain and dental problem. Negative for mouth sores, tongue pain, tongue lesion, facial swelling, sore throat, trouble swallowing and voice change.         Objective:   The physical exam was conducted virtually.  Physical Exam   Constitutional: She is oriented to person, place, and time. She does not appear ill. No distress.   HENT:   Head: Normocephalic and atraumatic.   Nose: Nose normal.   Mouth/Throat: No oral lesions. Abnormal dentition. Dental abscesses and dental caries present.       Eyes: Extraocular movement intact   Pulmonary/Chest: Effort normal.   Abdominal: Normal appearance.   Musculoskeletal: Normal range of motion.         General: Normal range of motion.   Neurological: no focal deficit. She is alert and oriented to person, place, and time.   Psychiatric: Her behavior is normal. Mood normal.   Vitals reviewed.      Assessment:     1. Pain, dental        Plan:     Patient encouraged to monitor symptoms closely and instructed to follow-up for new or worsening symptoms. Further, in-person, evaluation may be necessary for continued treatment. Please follow up with your primary care doctor or specialist as needed. Verbally discussed plan. Patient confirms understanding and is in agreement with treatment  and plan.     You must understand that you've received a Virtual Care evaluation only and that you may be released before all your medical problems are known or treated. You, the patient, will arrange for follow up care as instructed.    Pain, dental  -     amoxicillin (AMOXIL) 500 MG capsule; Take 2 tablets on day 1, then 1 tablet every 8 hours for 3 days  Dispense: 11 capsule; Refill: 0  -     chlorhexidine (PERIDEX) 0.12 % solution; Use as directed 15 mLs in the mouth or throat 2 (two) times daily. Swish for 30 seconds and spit for 14 days  Dispense: 420 mL; Refill: 0  -     ibuprofen (ADVIL,MOTRIN) 800 MG tablet; Take 1 tablet (800 mg total) by mouth 3 (three) times daily. Take with food. for 10 days  Dispense: 30 tablet; Refill: 0      Patient Instructions   Dental Pain Discharge Instructions   About this topic   Tooth pain happens when the nerve in a tooth or the gum tissue around a tooth is irritated. You may have an intense, sharp pain or a mild, dull pain. The pain may be all the time or come and go. Sometimes, you may not know which tooth is causing your pain. You may think the pain is coming from more than one tooth. A fractured tooth, tooth decay, or gum disease are most often the cause of tooth pain. Tooth problems can cause pain in other areas of the head and neck. At times, tooth problems may also cause ear and jaw pain. Pain from a sinus infection may also feel like tooth pain.         What care is needed at home?   Ask your dentist what you need to do when you go home. Make sure you ask questions if you do not understand what the dentist says. This way you will know what you need to do.  Avoid very cold or very hot food or drinks. These may make pain worse.  What follow-up care is needed?   Your dentist may ask you to make visits to the office to check on your progress. Be sure to keep these visits.  Your dentist may send you to an endodontist if you need a root canal.  Your dentist may send you to  a periodontist if you have gum disease.  Your dentist may send you to an oral surgeon if you need a tooth or teeth removed.  What drugs may be needed?   The dentist may order drugs to:  Help with pain and swelling  Prevent or fight an infection  Will physical activity be limited?   Based on the cause of your tooth pain, you may have to limit your activity for a short time. Ask your dentist if you need to make changes to your daily routine.  What problems could happen?   If dental pain is not treated you may have:  Infection that spreads to other parts of the body  Gum disease that spreads to other parts of the mouth or body  Tooth decay that gets worse and causes a loss of the tooth or nearby teeth  Pain that gets worse or more intense  Problems eating due to infection or pain  What can be done to prevent this health problem?   Brush your teeth at least 2 times a day. Use toothpaste with fluoride.  Use dental floss to clean between your teeth every day.  Eat a well-balanced diet.  Try to stay away from foods and drinks that are high in acid, sugar, and starch. These are things like chocolate, sweets, cakes, and fizzy drinks.  See your dentist for regular cleaning and checkups.  Wear a mouth guard or headgear when playing sports.  If you are a smoker, stop smoking. Smoking can make some dental problems worse.  When do I need to call the doctor?   Signs of infection. These include fever of 100.4° F (38°C) or higher; chills; swelling of the gums, neck, or face; discharge around a tooth; or bad taste in your mouth.  Bad toothache or your mouth hurts a lot  Pain that keeps you from sleeping  Trouble swallowing, breathing, or chewing  Trouble opening your mouth all the way  Jaw pain with ear, chest, shoulder, or arm pain  Lots of bleeding from the gums  Increased pain after a tooth is pulled. This may be a dry socket.  Your condition gets worse or you have other concerns  Teach Back: Helping You Understand   The Teach  Back Method helps you understand the information we are giving you. After you talk with the staff, tell them in your own words what you learned. This helps to make sure the staff has described each thing clearly. It also helps to explain things that may have been confusing. Before going home, make sure you can do these:  I can tell you about my pain.  I can tell you what may help ease my pain.  I can tell you what I will do if I have trouble breathing, eating, or sleeping because of the pain.  Where can I learn more?   N[facility] Choices  https://www.nhs.uk/conditions/dental-abscess/   [facility] Choices  https://www.nhs.uk/conditions/toothache/   Last Reviewed Date   2021-09-10  Consumer Information Use and Disclaimer   This information is not specific medical advice and does not replace information you receive from your health care provider. This is only a brief summary of general information. It does NOT include all information about conditions, illnesses, injuries, tests, procedures, treatments, therapies, discharge instructions or life-style choices that may apply to you. You must talk with your health care provider for complete information about your health and treatment options. This information should not be used to decide whether or not to accept your health care providers advice, instructions or recommendations. Only your health care provider has the knowledge and training to provide advice that is right for you.  Copyright   Copyright © 2021 [employer] and its affiliates and/or licensors. All rights reserved.